# Patient Record
(demographics unavailable — no encounter records)

---

## 2025-05-13 NOTE — HEALTH RISK ASSESSMENT
[PHQ-2 Negative - No further assessment needed] : PHQ-2 Negative - No further assessment needed [I have developed a follow-up plan documented below in the note.] : I have developed a follow-up plan documented below in the note. [Time Spent: ___ Minutes] : I spent [unfilled] minutes performing a depression screening for this patient.

## 2025-05-13 NOTE — PHYSICAL EXAM
[Normal] : no joint swelling and grossly normal strength and tone [de-identified] : Small vesicles noted in the suprapubic area

## 2025-05-13 NOTE — HISTORY OF PRESENT ILLNESS
[FreeTextEntry8] : Patient presented today to establish care with a new provider.  Patient has never seen any provider in many years.  Not on any medications.  Does not have any significant medical history.  Few years ago he had lower back surgery done for stenosis.  Now he feels fine.  Patient stated that for the last 3 months he has been noticing painful vesicles in his suprapubic area.  He went to urgent care a while ago and they tested her for STDs.  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  Patient took some over-the-counter medications here and there with partial relief only.  He stated that it burns itches.  And it has not fully resolved during this time.  Denies fever or any swollen lymph nodes in the body.  No lesions noted on the lips.  Patient never had it in the past. 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